# Patient Record
Sex: FEMALE | Race: BLACK OR AFRICAN AMERICAN | NOT HISPANIC OR LATINO | Employment: STUDENT | URBAN - METROPOLITAN AREA
[De-identification: names, ages, dates, MRNs, and addresses within clinical notes are randomized per-mention and may not be internally consistent; named-entity substitution may affect disease eponyms.]

---
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
## 2024-12-27 ENCOUNTER — HOSPITAL ENCOUNTER (EMERGENCY)
Facility: OTHER | Age: 19
Discharge: HOME | End: 2024-12-27
Attending: EMERGENCY MEDICINE
Payer: MEDICAID

## 2024-12-27 VITALS
TEMPERATURE: 99 F | HEIGHT: 67 IN | BODY MASS INDEX: 27.94 KG/M2 | OXYGEN SATURATION: 99 % | DIASTOLIC BLOOD PRESSURE: 67 MMHG | RESPIRATION RATE: 18 BRPM | SYSTOLIC BLOOD PRESSURE: 111 MMHG | WEIGHT: 178 LBS | HEART RATE: 63 BPM

## 2024-12-27 DIAGNOSIS — R11.2 NAUSEA AND VOMITING, UNSPECIFIED VOMITING TYPE: Primary | ICD-10-CM

## 2024-12-27 DIAGNOSIS — E86.0 DEHYDRATION: ICD-10-CM

## 2024-12-27 LAB
B-HCG UR QL: NEGATIVE
CTP QC/QA: YES

## 2024-12-27 PROCEDURE — 25000003 PHARM REV CODE 250: Performed by: EMERGENCY MEDICINE

## 2024-12-27 PROCEDURE — 96361 HYDRATE IV INFUSION ADD-ON: CPT

## 2024-12-27 PROCEDURE — 81025 URINE PREGNANCY TEST: CPT | Performed by: NURSE PRACTITIONER

## 2024-12-27 PROCEDURE — 96375 TX/PRO/DX INJ NEW DRUG ADDON: CPT

## 2024-12-27 PROCEDURE — 96374 THER/PROPH/DIAG INJ IV PUSH: CPT

## 2024-12-27 PROCEDURE — 63600175 PHARM REV CODE 636 W HCPCS: Performed by: EMERGENCY MEDICINE

## 2024-12-27 PROCEDURE — 99284 EMERGENCY DEPT VISIT MOD MDM: CPT | Mod: 25

## 2024-12-27 RX ORDER — OMEPRAZOLE 20 MG/1
20 CAPSULE, DELAYED RELEASE ORAL DAILY
Qty: 30 CAPSULE | Refills: 0 | Status: SHIPPED | OUTPATIENT
Start: 2024-12-27 | End: 2025-12-27

## 2024-12-27 RX ORDER — ONDANSETRON HYDROCHLORIDE 2 MG/ML
4 INJECTION, SOLUTION INTRAVENOUS
Status: COMPLETED | OUTPATIENT
Start: 2024-12-27 | End: 2024-12-27

## 2024-12-27 RX ORDER — ONDANSETRON 4 MG/1
4 TABLET, ORALLY DISINTEGRATING ORAL
Status: DISCONTINUED | OUTPATIENT
Start: 2024-12-27 | End: 2024-12-27

## 2024-12-27 RX ORDER — FAMOTIDINE 10 MG/ML
20 INJECTION INTRAVENOUS
Status: COMPLETED | OUTPATIENT
Start: 2024-12-27 | End: 2024-12-27

## 2024-12-27 RX ORDER — ONDANSETRON 4 MG/1
4 TABLET, ORALLY DISINTEGRATING ORAL EVERY 6 HOURS PRN
Qty: 15 TABLET | Refills: 0 | Status: SHIPPED | OUTPATIENT
Start: 2024-12-27

## 2024-12-27 RX ADMIN — ONDANSETRON 4 MG: 2 INJECTION INTRAMUSCULAR; INTRAVENOUS at 03:12

## 2024-12-27 RX ADMIN — SODIUM CHLORIDE 1000 ML: 9 INJECTION, SOLUTION INTRAVENOUS at 03:12

## 2024-12-27 RX ADMIN — FAMOTIDINE 20 MG: 10 INJECTION, SOLUTION INTRAVENOUS at 03:12

## 2024-12-27 NOTE — FIRST PROVIDER EVALUATION
" Emergency Department TeleTriage Encounter Note      CHIEF COMPLAINT    Chief Complaint   Patient presents with    Emesis     Nausea and vomiting since 4am. Believes she is "hungover" due to drinking alcohol last night. Unable to hold anything down.       VITAL SIGNS   Initial Vitals [12/27/24 1333]   BP Pulse Resp Temp SpO2   118/88 61 18 97.5 °F (36.4 °C) 100 %      MAP       --            ALLERGIES    Review of patient's allergies indicates:  No Known Allergies    PROVIDER TRIAGE NOTE  Verbal consent for the teletriage evaluation was given by the patient at the start of the evaluation.  All efforts will be made to maintain patient's privacy during the evaluation.      This is a teletriage evaluation of a 19 y.o. female presenting to the ED with c/o nausea and vomiting that started after drinking alcohol a lot yesterday. Limited physical exam via telehealth: The patient is awake, alert, answering questions appropriately and is not in respiratory distress.  As the Teletriage provider, I performed an initial assessment and ordered appropriate labs and imaging studies, if any, to facilitate the patient's care once placed in the ED. Once a room is available, care and a full evaluation will be completed by an alternate ED provider.  Any additional orders and the final disposition will be determined by that provider.  All imaging and labs will not be followed-up by the Teletriage Team, including myself.         ORDERS  Labs Reviewed - No data to display    ED Orders (720h ago, onward)      Start Ordered     Status Ordering Provider    12/27/24 1350 12/27/24 1349  Orthostatic blood pressure  Once         Ordered CASE CASTILLO    12/27/24 1350 12/27/24 1349  POCT urine pregnancy  Once         Ordered CASE CASTILLO              Virtual Visit Note: The provider triage portion of this emergency department evaluation and documentation was performed via [employer], a HIPAA-compliant telemedicine application, in concert with " a tele-presenter in the room. A face to face patient evaluation with one of my colleagues will occur once the patient is placed in an emergency department room.      DISCLAIMER: This note was prepared with [employer] voice recognition transcription software. Garbled syntax, mangled pronouns, and other bizarre constructions may be attributed to that software system.

## 2024-12-27 NOTE — ED PROVIDER NOTES
"Chief complaint:  Emesis (Nausea and vomiting since 4am. Believes she is "hungover" due to drinking alcohol last night. Unable to hold anything down.)      Source of information:  Patient, old chart    HPI:  Arlette White is a 19 y.o. female presenting with nausea vomiting which started about 4:00 a.m..  Patient states that she had numerous episodes of nonbloody emesis earlier today.  At that time she had terrible stomach cramping which caused her to lay down on the floor curled up.  Since then the nausea and vomiting have improved, she is able to take small sips of Gatorade.  Abdominal pain is now described as mild.  No fevers, no diarrhea no difficulty urinating.  She has had occasional milder symptoms similar to this in the past.  She attributes today's episode to heavy EtOH yesterday.  Patient can not remember exactly how much alcohol she had, admits to lapses in memory for the evening.  Denies other drugs.  Denies history of gallbladder liver pancreatic disease.  No abdominal surgeries previously.  Normal periods, doubts pregnancy.  No other acute complaints    ROS: As per HPI    Review of patient's allergies indicates:  No Known Allergies    No current facility-administered medications on file prior to encounter.     Current Outpatient Medications on File Prior to Encounter   Medication Sig Dispense Refill    albuterol (PROVENTIL/VENTOLIN HFA) 90 mcg/actuation inhaler Inhale 1-2 puffs into the lungs every 6 (six) hours as needed for Wheezing. Rescue 6.7 g 0    dicyclomine (BENTYL) 20 mg tablet Take 1 tablet (20 mg total) by mouth every 12 (twelve) hours as needed (abdominal cramping). 12 tablet 0    fluticasone propionate (FLONASE) 50 mcg/actuation nasal spray 1 spray (50 mcg total) by Each Nostril route 2 (two) times daily as needed for Rhinitis. 15 g 0    naproxen (NAPROSYN) 500 MG tablet Take 1 tablet (500 mg total) by mouth 2 (two) times daily with meals. 20 tablet 0       PMH:  As per HPI and below:  Past " Medical History:   Diagnosis Date    Allergy     Headache     Urinary tract infection      History reviewed. No pertinent surgical history.      Physical Exam:    Vitals:    12/27/24 1646   BP: 111/67   Pulse: 63   Resp: 18   Temp: 98.5 °F (36.9 °C)       General:  Tired and uncomfortable appearing but in no acute distress. Well developed. Well nourished.  Eyes: PERRL. EOM intact. no photophobia, no nystagmus  Conjunctivae - no pallor or icterus.   ENT: HEAD: Normal - atraumatic. Normal external ears. Normal nose.  No facial asymmetry. Mucous membranes - dry.  Neck: Neck supple. no meningismus.  No JVD.  Cardiovascular: Regular rate and rhythm. Normal S1 and S2. No murmur. No gallop. No rub.  2+ peripheral pulses  GI: Soft.  Mild diffuse tenderness.  Nondistended. No guarding. No rebound. Normal BS.  Negative Nieves sign.  Musculoskeletal:  Normal weight-bearing and gait No deformities. Normal ROM x4.   Integument: No acute skin rashes. No clubbing or cyanosis  Neurologic: No gross neurological deficits.    Psychiatric: Awake, alert.  Oriented x3.  Normal speech and mentation.        Labs Reviewed   POCT URINE PREGNANCY       Result Value    POC Preg Test, Ur Negative       Acceptable Yes         Medications   sodium chloride 0.9% bolus 1,000 mL 1,000 mL (0 mLs Intravenous Stopped 12/27/24 1648)   famotidine (PF) injection 20 mg (20 mg Intravenous Given 12/27/24 1542)   ondansetron injection 4 mg (4 mg Intravenous Given 12/27/24 1544)     Medical Decision Making  Differential diagnosis includes gastroenteritis, alcoholic gastritis, food-borne illness    Amount and/or Complexity of Data Reviewed  External Data Reviewed: notes.  Labs: ordered. Decision-making details documented in ED Course.    Risk  Prescription drug management.  Decision regarding hospitalization.        MDM:    19 y.o. female with nausea vomiting x1 day.  His setting of heavy alcohol use yesterday.  The patient also reports a history  of gastritis in the past.  Encouraged alcohol moderation or cessation as suspect this is contributing or causative of patient's symptoms.  The patient is afebrile, stable vital signs, benign abdomen.  Did appear mildly dehydrated.  IV fluid repletion and antiemetics begun and afterwards the patient is feeling better.  No further emesis in the emergency department.  Tolerating clear liquids.  Will give prescription for Zofran and started on course of omeprazole.  Encouraged continued hydration gradually advancing diet at home.  Encouraged follow-up with primary care, especially if symptoms persist.  Mom also at bedside for discussion of findings, plan of care    Medications   sodium chloride 0.9% bolus 1,000 mL 1,000 mL (0 mLs Intravenous Stopped 12/27/24 1648)   famotidine (PF) injection 20 mg (20 mg Intravenous Given 12/27/24 1542)   ondansetron injection 4 mg (4 mg Intravenous Given 12/27/24 1544)       ASSESSMENT:   1. Nausea and vomiting, unspecified vomiting type    2. Dehydration             Kingsley Sherman II, MD  12/27/24 3705

## 2024-12-27 NOTE — ED TRIAGE NOTES
Pt states she started vomiting at 0400 am. She denies any diarrhea. Admits to have 7-8 drinks last night and thinks this is the cause.